# Patient Record
Sex: MALE | Race: OTHER | Employment: UNEMPLOYED | URBAN - METROPOLITAN AREA
[De-identification: names, ages, dates, MRNs, and addresses within clinical notes are randomized per-mention and may not be internally consistent; named-entity substitution may affect disease eponyms.]

---

## 2017-10-17 ENCOUNTER — TELEPHONE (OUTPATIENT)
Dept: CARDIOLOGY CLINIC | Age: 52
End: 2017-10-17

## 2017-12-07 ENCOUNTER — OFFICE VISIT (OUTPATIENT)
Dept: CARDIOLOGY CLINIC | Age: 52
End: 2017-12-07

## 2017-12-07 VITALS
DIASTOLIC BLOOD PRESSURE: 75 MMHG | BODY MASS INDEX: 23.82 KG/M2 | HEART RATE: 81 BPM | WEIGHT: 143 LBS | HEIGHT: 65 IN | OXYGEN SATURATION: 95 % | SYSTOLIC BLOOD PRESSURE: 108 MMHG

## 2017-12-07 DIAGNOSIS — I25.10 CORONARY ARTERY DISEASE DUE TO LIPID RICH PLAQUE: Primary | ICD-10-CM

## 2017-12-07 DIAGNOSIS — I20.8 OTHER FORMS OF ANGINA PECTORIS (HCC): ICD-10-CM

## 2017-12-07 DIAGNOSIS — I25.83 CORONARY ARTERY DISEASE DUE TO LIPID RICH PLAQUE: Primary | ICD-10-CM

## 2017-12-07 DIAGNOSIS — R06.00 DYSPNEA, UNSPECIFIED TYPE: ICD-10-CM

## 2017-12-07 RX ORDER — ISOSORBIDE MONONITRATE 30 MG/1
TABLET, EXTENDED RELEASE ORAL DAILY
COMMUNITY

## 2017-12-07 RX ORDER — NITROGLYCERIN 0.4 MG/1
0.4 TABLET SUBLINGUAL
Qty: 1 BOTTLE | Refills: 3 | Status: SHIPPED | OUTPATIENT
Start: 2017-12-07

## 2017-12-07 RX ORDER — RANITIDINE 300 MG/1
300 TABLET ORAL DAILY
COMMUNITY

## 2017-12-07 RX ORDER — SIMVASTATIN 40 MG/1
TABLET, FILM COATED ORAL
COMMUNITY

## 2017-12-07 RX ORDER — ENALAPRIL MALEATE 2.5 MG/1
TABLET ORAL DAILY
COMMUNITY

## 2017-12-07 RX ORDER — CHLORPROMAZINE HYDROCHLORIDE 100 MG/1
100 TABLET, FILM COATED ORAL 3 TIMES DAILY
COMMUNITY

## 2017-12-07 RX ORDER — ASPIRIN 81 MG/1
81 TABLET ORAL DAILY
Qty: 30 TAB | Refills: 6 | Status: ON HOLD | OUTPATIENT
Start: 2017-12-07 | End: 2018-05-10

## 2017-12-07 RX ORDER — CARVEDILOL 12.5 MG/1
12.5 TABLET ORAL 2 TIMES DAILY WITH MEALS
COMMUNITY

## 2017-12-07 RX ORDER — LEVOTHYROXINE SODIUM 125 UG/1
TABLET ORAL
COMMUNITY

## 2017-12-07 RX ORDER — DIGOXIN 125 MCG
TABLET ORAL DAILY
Status: ON HOLD | COMMUNITY
End: 2018-05-10

## 2017-12-07 RX ORDER — DIPHENHYDRAMINE HCL 25 MG
25 TABLET ORAL
COMMUNITY
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
## 2017-12-07 NOTE — MR AVS SNAPSHOT
Visit Information Yomi Erickson Personal Médico Departamento Teléfono del Dep. Número de visita 12/7/2017  1:30 PM Bijan Toledo MD Cardio Specialist at [facility] [phone] Your Appointments 12/27/2017  1:00 PM  
Follow Up with Rubi Metcalf MD  
Cardio Specialist at [facility]) Appt Note: f/u after testing [facility] [address] Upcoming Health Maintenance Date Due Hepatitis C Screening 1965 DTaP/Tdap/Td series (1 - Tdap) 8/24/1985 FOBT Q 1 YEAR AGE 50-75 8/24/2014 Influenza Age 5 to Adult 8/1/2017 [place]  Review Complete El: 12/7/2017 Por: Laureano Bruce A partir del:  12/7/2017 No Known Allergies Vacunas actuales [facility] No hay ninguna vacuna archivada. No revisadas esta visita You Were Diagnosed With   
  
 France Ansari Coronary artery disease due to lipid rich plaque    -  Primary ICD-10-CM: I25.10, I25.83 ICD-9-CM: 414.00, 414.3 Dyspnea, unspecified type     ICD-10-CM: R06.00 
ICD-9-CM: 786.09 Other forms of angina pectoris ([address]     ICD-10-CM: I20.8 ICD-9-CM: 413.9 Partes vitales PS Pulso [place] ( percentil de crecimiento) Peso (percentil de crecimiento) SpO2 BMI ([facility])  
 108/75 81 5' 5\" (1.651 m) 143 lb (64.9 kg) 95% 23.8 kg/m2 Estatus de tabaquísmo Never Smoker BMI and BSA Data Body Mass Index Body Surface Area  
 23.8 kg/m 2 1.73 m 2 Levy lista de medicamentos actualizada Lista actualizada el: 12/7/17  2:24 PM.  Vola Clamp use levy lista de medicamentos más reciente. ALLERGY 25 mg tablet Medicamento genérico:  diphenhydrAMINE Take 25 mg by mouth every six (6) hours as needed for Sleep. chlorproMAZINE 100 mg tablet También conocido maite:  THORAZINE Take 100 mg by mouth three (3) times daily. COREG 12.5 mg tablet Medicamento genérico:  carvedilol Take 12.5 mg by mouth two (2) times daily (with meals). digoxin 0.125 mg tablet También conocido maite:  LANOXIN Take  by mouth daily. enalapril 2.5 mg tablet También conocido maite:  Janet Burow Take  by mouth daily. isosorbide mononitrate ER 30 mg tablet También conocido maite:  IMDUR Take  by mouth daily. levothyroxine 125 mcg tablet También conocido maite:  SYNTHROID Take  by mouth Daily (before breakfast). ZANTAC 300 mg tablet Medicamento genérico:  raNITIdine Take 300 mg by mouth daily. ZOCOR 40 mg tablet Medicamento genérico:  simvastatin Take  by mouth nightly. Hicimos lo siguiente AMB POC EKG ROUTINE W/ 12 LEADS, INTER & REP [39855 CPT(R)] LIPID PANEL [73052 CPT(R)] Comentarios:  
 Fasting 12 hours prior! Water only. Por hacer 12/18/2017 ECHO:  2D ECHO COMPLETE ADULT (TTE) W OR WO CONTR   
  
 12/18/2017 Imaging:  NM CARDIAC SPECT W STRS/REST MULT   
  
 12/18/2017 ECG:  NUCLEAR STRESS TEST Introducing [facility] & HEALTH SERVICES! [facility] introduce portal paciente MyChart . Ahora se puede acceder a partes de rodriguez expediente médico, enviar por correo electrónico la oficina de rodriguez médico y solicitar renovaciones de medicamentos en línea. En rodriguez navegador de Internet , Becky Ashley a [email or web address] Natacha clic en el usuario por Loli Hawk? Bogdan Carrs clic aquí en la sesión Eleanor Favre. Verá la página de registro [place]. Ingrese rodriguez código de [employer] silas y maite aparece a continuación. Usted no tendrá que [employer] código después de becki completado el proceso de registro . Si usted no se inscribe antes de la fecha de caducidad , debe solicitar un nuevo código. · MyChart Código de acceso : [id number] Expires: 3/7/2018  2:23 PM 
 
 Ingresa los últimos cuatro dígitos de rodriguez Número de Seguro Social ( xxxx ) y fecha de nacimiento ( dd / mm / aaaa ) maite se indica y natacha clic en Enviar. Usted será llevado a la siguiente página de registro . Crear un ID MyChart . Esta será rodriguez ID de inicio de sesión de MyChart y no puede ser [place] , por lo que pensar en abraham que es Katherine Ced[place] y fácil de recordar . Crear abraham contraseña MyChart . Usted puede cambiar rodriguez contraseña en cualquier momento . Ingrese rodriguez Password Reset de preguntas y [place] . [place] se puede utilizar en un momento posterior si usted olvida rodriguez contraseña. Introduzca rodriguez dirección de correo electrónico . Sirena Points recibirá abraham notificación por correo electrónico cuando la nueva información está disponible en MyChart . Suzen Lopez clic en Registrarse. Rbuen Torresa anup y descargar porciones de rodriguez expediente médico. 
Natacha clic en el enlace de descarga del menú Resumen para descargar abraham copia portátil de rodriguez información médica . Si tiene [employer] , por favor visite la sección de preguntas frecuentes del sitio web MyChart . Recuerde, MyChart NO es que se utilizará para las necesidades urgentes. Para emergencias médicas , llame al 911 . Ahora disponible en rodriguez iPhone y Android ! Por favor proporcione jatinder resumen de la documentación de cuidado a rodriguez próximo proveedor. Your primary care clinician is listed as Roxi Walls. If you have any questions after today's visit, please call [phone].

## 2017-12-08 ENCOUNTER — HOSPITAL ENCOUNTER (OUTPATIENT)
Dept: LAB | Age: 52
Discharge: HOME | End: 2017-12-08

## 2017-12-08 PROCEDURE — 99001 SPECIMEN HANDLING PT-LAB: CPT | Performed by: INTERNAL MEDICINE

## 2017-12-09 LAB
CHOLEST SERPL-MCNC: 170 MG/DL (ref 100–199)
HDLC SERPL-MCNC: 44 MG/DL
LDLC SERPL CALC-MCNC: 103 MG/DL (ref 0–99)
TRIGL SERPL-MCNC: 114 MG/DL (ref 0–149)
VLDLC SERPL CALC-MCNC: 23 MG/DL (ref 5–40)

## 2017-12-19 ENCOUNTER — HOSPITAL ENCOUNTER (OUTPATIENT)
Dept: NUCLEAR MEDICINE | Age: 52
Discharge: HOME | End: 2017-12-19
Attending: INTERNAL MEDICINE

## 2017-12-19 ENCOUNTER — HOSPITAL ENCOUNTER (OUTPATIENT)
Dept: NON INVASIVE DIAGNOSTICS | Age: 52
End: 2017-12-19
Attending: INTERNAL MEDICINE

## 2017-12-19 DIAGNOSIS — I20.8 OTHER FORMS OF ANGINA PECTORIS (HCC): ICD-10-CM

## 2017-12-19 DIAGNOSIS — I25.83 CORONARY ARTERY DISEASE DUE TO LIPID RICH PLAQUE: ICD-10-CM

## 2017-12-19 DIAGNOSIS — R06.00 DYSPNEA, UNSPECIFIED TYPE: ICD-10-CM

## 2017-12-19 DIAGNOSIS — I25.10 CORONARY ARTERY DISEASE DUE TO LIPID RICH PLAQUE: ICD-10-CM

## 2017-12-27 ENCOUNTER — HOSPITAL ENCOUNTER (OUTPATIENT)
Dept: NON INVASIVE DIAGNOSTICS | Age: 52
Discharge: HOME | End: 2017-12-27
Attending: INTERNAL MEDICINE
Payer: MEDICARE

## 2017-12-27 ENCOUNTER — HOSPITAL ENCOUNTER (OUTPATIENT)
Dept: NUCLEAR MEDICINE | Age: 52
Discharge: HOME | End: 2017-12-27
Attending: INTERNAL MEDICINE
Payer: MEDICARE

## 2017-12-27 ENCOUNTER — OFFICE VISIT (OUTPATIENT)
Dept: CARDIOLOGY CLINIC | Age: 52
End: 2017-12-27

## 2017-12-27 VITALS
OXYGEN SATURATION: 96 % | BODY MASS INDEX: 23.49 KG/M2 | HEART RATE: 70 BPM | WEIGHT: 141 LBS | HEIGHT: 65 IN | SYSTOLIC BLOOD PRESSURE: 120 MMHG | DIASTOLIC BLOOD PRESSURE: 80 MMHG

## 2017-12-27 DIAGNOSIS — I20.8 OTHER FORMS OF ANGINA PECTORIS (HCC): ICD-10-CM

## 2017-12-27 DIAGNOSIS — I25.83 CORONARY ARTERY DISEASE DUE TO LIPID RICH PLAQUE: ICD-10-CM

## 2017-12-27 DIAGNOSIS — I42.9 CARDIOMYOPATHY, UNSPECIFIED TYPE (HCC): ICD-10-CM

## 2017-12-27 DIAGNOSIS — I25.10 CORONARY ARTERY DISEASE DUE TO LIPID RICH PLAQUE: ICD-10-CM

## 2017-12-27 DIAGNOSIS — R06.00 DYSPNEA, UNSPECIFIED TYPE: ICD-10-CM

## 2017-12-27 DIAGNOSIS — I10 ESSENTIAL HYPERTENSION WITH GOAL BLOOD PRESSURE LESS THAN 140/90: Primary | ICD-10-CM

## 2017-12-27 PROCEDURE — 93306 TTE W/DOPPLER COMPLETE: CPT

## 2017-12-27 PROCEDURE — 78452 HT MUSCLE IMAGE SPECT MULT: CPT

## 2017-12-27 PROCEDURE — 93017 CV STRESS TEST TRACING ONLY: CPT

## 2017-12-27 NOTE — MR AVS SNAPSHOT
Visit Information Kaela Vencesjusodin Personal Médico Departamento Teléfono del Dep. Número de visita  
 12/27/2017  1:00 PM Bijan Morse MD Cardio Specialist at [place] [address] [phone] Follow-up Instructions Return in about 3 months (around 3/27/2018). Follow-up and Disposition History Your Appointments 3/27/2018  1:15 PM  
Follow Up with Bijan Morse MD  
Cardio Specialist at [facility]) Appt Note: 3 months follow up  
 [address] [facility] [address] [facility] [address]  
  
   
 [address] [facility] Upcoming Health Maintenance Date Due Hepatitis C Screening 1965 DTaP/Tdap/Td series (1 - Tdap) 8/24/1985 FOBT Q 1 YEAR AGE 50-75 8/24/2014 Influenza Age 5 to Adult 8/1/2017 Alergias  Review Complete El: 12/27/2017 Por: Gloria Ritchie A partir del:  12/27/2017 No Known Allergies Vacunas actuales Lisa Spruce No hay ninguna vacuna archivada. No revisadas esta visita You Were Diagnosed With   
  
 Margot Franco Essential hypertension with goal blood pressure less than 140/90    -  Primary ICD-10-CM: I10 
ICD-9-CM: 401.9 Cardiomyopathy, unspecified type ([address]     ICD-10-CM: I42.9 ICD-9-CM: 425.4 Partes vitales PS Pul[place] ( percentil de crecimiento) Peso (percentil de crecimiento) SpO2 BMI [facility]) 120/80 70 5' 5\" (1.651 m) 141 lb (64 kg) 96% 23.46 kg/m2 Estatus de tabaquísmo Never Smoker BMI and BSA Data Body Mass Index Body Surface Area  
 23.46 kg/m 2 1.71 m 2 Levy lista de medicamentos actualizada Lista actualizada el: 12/27/17  1:52 PM.  Lawerence Coup use levy lista de medicamentos más reciente. ALLERGY 25 mg tablet Medicamento genérico:  diphenhydrAMINE Take 25 mg by mouth every six (6) hours as needed for Sleep. aspirin delayed-release 81 mg tablet Take 1 Tab by mouth daily. chlorproMAZINE 100 mg tablet También conocido maite:  THORAZINE Take 100 mg by mouth three (3) times daily. COREG 12.5 mg tablet Medicamento genérico:  carvedilol Take 12.5 mg by mouth two (2) times daily (with meals). digoxin 0.125 mg tablet También conocido maite:  LANOXIN Take  by mouth daily. enalapril 2.5 mg tablet También conocido maite:  M[place] Captain Take  by mouth daily. isosorbide mononitrate ER 30 mg tablet También conocido maite:  IMDUR Take  by mouth daily. levothyroxine 125 mcg tablet También conocido maite:  SYNTHROID Take  by mouth Daily (before breakfast). nitroglycerin 0.4 mg SL tablet También conocido maite:  NITROSTAT  
1 Tab by SubLINGual route every five (5) minutes as needed for Chest Pain. ZANTAC 300 mg tablet Medicamento genérico:  raNITIdine Take 300 mg by mouth daily. ZOCOR 40 mg tablet Medicamento genérico:  simvastatin Take  by mouth nightly. Instrucciones de seguimiento Return in about 3 months (around 3/27/2018). Por hacer 12/27/2017  2:00 PM  
  Appointment with [facility] [address] [facility] CV SERV at [facility] NON-INVASIVE [address] [place] [address] ([phone]) Patient needs to bring a current list of all medications  No preparation is required for this study  This study requires patient to bring a written physicians order or the MD office may fax the order to Central Scheduling at [phone]. This exam is performed in the [address] at [facility] in the echo department. Please have the patient arrive 15 minutes prior to their schedule appointment time and report to Patient Registration at the main entrance of the hospital.     AGE LIMIT for this procedure at [facility] is 25years old. All patients under 25years of age should be referred to [facility]. Instrucciones para el Paciente Take Lasix- (Furosemide) every other day Introducing hospitals HEALTH SERVICES! [facility] introduce portal paciente MyChart . Ahora se puede acceder a partes de rodriguez expediente médico, enviar por correo electrónico la oficina de rodriguez médico y solicitar renovaciones de medicamentos en línea. En rodriguez navegador de Internet , Veronique Govea a [email or web address] Sherlyn clic en el usuario por Julius Jacobo? Marlyn Gasmen clic aquí en la sesión Cincinnatigibran Shah. Verá la página de registro [place]. Ingrese rodriguez código de [employer] silas y maite aparece a continuación. Usted no tendrá que [employer] código después de becki completado el proceso de registro . Si usted no se inscribe antes de la fecha de caducidad , debe solicitar un nuevo código. · MyChart Código de acceso : [id number] Expires: 3/7/2018  2:23 PM 
 
Ingresa los últimos cuatro dígitos de rodriguez Número de Seguro Social ( xxxx ) y fecha de nacimiento ( dd / mm / aaaa ) maite se indica y sherlyn clic en Enviar. Usted será llevado a la siguiente página de registro . Crear un ID MyChart . Esta será rodriguez ID de inicio de sesión de MyChart y no puede ser [place] , por lo que pensar en abraham que es [place] y fácil de recordar . Crear abraham contraseña MyChart . Usted puede cambiar rodriguez contraseña en cualquier momento . Ingrese rodriguez Password Reset de preguntas y [place] . [place] se puede utilizar en un momento posterior si usted olvida rodriguez contraseña. Introduzca rodriguez dirección de correo electrónico . Bruno Kidney recibirá abraham notificación por correo electrónico cuando la nueva información está disponible en MyChart . Porfirio pink en Registrarse. Jael Manley anup y descargar porciones de rodriguez expediente médico. 
Sherlyn joesph en el enlace de descarga del menú Resumen para descargar abraham copia portátil de rodriguez información médica . Si tiene [employer] , por favor visite la sección de preguntas frecuentes del sitio web MyChart .  Recuerde, MyCharspring NO es que se utilizará para las [employer]. Para emergencias médicas , llame al 911 . Ahora disponible en rodriguez iPhone y Android ! Por favor proporcione jatinder resumen de la documentación de cuidado a rodriguez próximo proveedor. Your primary care clinician is listed as Fatou Valdez. If you have any questions after today's visit, please call [phone].

## 2017-12-27 NOTE — PROGRESS NOTES
Cardiovascular Specialists    Mr. Jagruti Olivo is a 47 y.o. Sami speaking male with a history of hypertension, hyperlipidemia, mental retardation, mini stroke and possible cardiac problem. Mr. Martin Nuñez is here today with his nephew and the sister. There are no new symptoms to report since last time. He continues to have some mild to moderate dyspnea on moderate to severe exertion. There is no change. He denies any PND. He denies lower extremity swelling. There is no chest pain or chest tightness. Denies any nausea, vomiting, abdominal pain, fever, chills, sputum production. No hematuria or other bleeding complaints    Past Medical History:   Diagnosis Date    Essential hypertension     Hyperlipidemia     Mental retardation     per sister    Mini stroke ([facility] [address] 2014    Thyroid disease          Past Surgical History:   Procedure Laterality Date    HX CORONARY STENT PLACEMENT      HX HEART CATHETERIZATION         Current Outpatient Prescriptions   Medication Sig    chlorproMAZINE (THORAZINE) 100 mg tablet Take 100 mg by mouth three (3) times daily.  enalapril (VASOTEC) 2.5 mg tablet Take  by mouth daily.  raNITIdine (ZANTAC) 300 mg tablet Take 300 mg by mouth daily.  simvastatin (ZOCOR) 40 mg tablet Take  by mouth nightly.  carvedilol (COREG) 12.5 mg tablet Take 12.5 mg by mouth two (2) times daily (with meals).  isosorbide mononitrate ER (IMDUR) 30 mg tablet Take  by mouth daily.  levothyroxine (SYNTHROID) 125 mcg tablet Take  by mouth Daily (before breakfast).  digoxin (LANOXIN) 0.125 mg tablet Take  by mouth daily.  diphenhydrAMINE (ALLERGY) 25 mg tablet Take 25 mg by mouth every six (6) hours as needed for Sleep.  aspirin delayed-release 81 mg tablet Take 1 Tab by mouth daily.  nitroglycerin (NITROSTAT) 0.4 mg SL tablet 1 Tab by SubLINGual route every five (5) minutes as needed for Chest Pain.      No current facility-administered medications for this visit. Allergies and Sensitivities:  No Known Allergies    Family History:  No family history on file. Social History:  Social History   Substance Use Topics    Smoking status: Never Smoker    Smokeless tobacco: Never Used    Alcohol use No     He  reports that he has never smoked. He has never used smokeless tobacco.  He  reports that he does not drink alcohol. Review of Systems:  Cardiac symptoms as noted above in HPI. All others negative. Denies fatigue, malaise, skin rash, blurring vision, photophobia, neck pain, hemoptysis, chronic cough, nausea, vomiting, hematuria, burning micturition, BRBPR, chronic headaches. Physical Exam:  BP Readings from Last 3 Encounters:   12/07/17 108/75         Pulse Readings from Last 3 Encounters:   12/07/17 81          Wt Readings from Last 3 Encounters:   12/07/17 143 lb (64.9 kg)       Constitutional: Oriented to person, place, and time. HENT: Head: Normocephalic and atraumatic. Neck: No JVD present. Cardiovascular: Regular rhythm. No murmur, gallop or rubs appreciated  Lung: Breath sounds normal. No respiratory distress. No ronchi or rales appreciated  Abdominal: No tenderness. No rebound and no guarding. Musculoskeletal: There is trace lower extremity edema. No cynosis      Review of Data  LABS:   No results found for: NA, K, CL, CO2, GLU, BUN, CREA  Lipids Latest Ref Rng & Units 12/8/2017   Chol, Total 100 - 199 mg/dL 170   HDL >39 mg/dL 44   LDL 0 - 99 mg/dL 103(H)   Trig 0 - 149 mg/dL 114     No results found for: GPT, ALT  No results found for: HBA1C, HGBE8, OIR8QGVT, IPV6TEDH, HUD8YNJV    EKG  (12/17) Sinus rhythm at 71 beats per minute. Normal NE and QRS interval.  No pathologic Q wave. ECHO (12/17)    STRESS TEST (12/17)  - Exercise nuclear stress test using Sammy treadmill protocol.   - Ischemic changes: No ischemic changes with exercise.    - There was stopped small area of mildly intense partially reversible apical  defect noted which is most consistent with likely apical thinning or attenuation  artifact. Inferior perfusion defect is most likely from diaphragmatic  attenuation artifact. Otherwise no significant reversible defect noted  - Low risk stress test for ischemia. IMPRESSION & PLAN:  Mr. Reena Joel is a 47 y.o. male with multiple medical problems. Cardiomyopathy:  He has moderate cardiomyopathy on ECHO in 12/17. No significant fluid overload  NYHA CLASS II  On BB, ACE-I, Digoxin, IMdur  WIll give lasix 20 mg every other day. Stress test , low risk as mentioned above  Per sister, heart was very weak in [place]. NO details available. Per sister, this is better. Hypertension:  Currently Mr. Reena Joel is on Enalapril and Coreg and Imdur. /80 mm Hg    Hypothyroidism:  He is on Levothyroxine and I would recommend to continue same. This plan was discussed with patient, sister and nephew and sister who is in agreement. Thank you for allowing me to participate in patient care. Please feel free to call me if you have any question or concern. Katya Martin MD  Please note: This document has been produced using voice recognition software. Unrecognized errors in transcription may be present.

## 2017-12-28 RX ORDER — FUROSEMIDE 20 MG/1
TABLET ORAL
Qty: 30 TAB | Refills: 6 | Status: SHIPPED | OUTPATIENT
Start: 2017-12-28

## 2018-04-13 ENCOUNTER — OFFICE VISIT (OUTPATIENT)
Dept: SURGERY | Age: 53
End: 2018-04-13

## 2018-04-13 VITALS
HEART RATE: 108 BPM | BODY MASS INDEX: 23.99 KG/M2 | DIASTOLIC BLOOD PRESSURE: 80 MMHG | SYSTOLIC BLOOD PRESSURE: 106 MMHG | WEIGHT: 144 LBS | HEIGHT: 65 IN | RESPIRATION RATE: 20 BRPM | TEMPERATURE: 96 F

## 2018-04-13 DIAGNOSIS — Z12.11 ENCOUNTER FOR SCREENING COLONOSCOPY: Primary | ICD-10-CM

## 2018-04-13 RX ORDER — POLYETHYLENE GLYCOL 3350, SODIUM SULFATE ANHYDROUS, SODIUM BICARBONATE, SODIUM CHLORIDE, POTASSIUM CHLORIDE 236; 22.74; 6.74; 5.86; 2.97 G/4L; G/4L; G/4L; G/4L; G/4L
POWDER, FOR SOLUTION ORAL
Qty: 1 BOTTLE | Refills: 0 | Status: SHIPPED | OUTPATIENT
Start: 2018-04-13 | End: 2018-05-10

## 2018-04-13 RX ORDER — CHLORPROMAZINE HYDROCHLORIDE 100 MG/1
100 TABLET, FILM COATED ORAL 3 TIMES DAILY
Status: ON HOLD | COMMUNITY
End: 2018-05-10

## 2018-04-13 RX ORDER — DIGOXIN 125 MCG
TABLET ORAL DAILY
COMMUNITY

## 2018-04-13 NOTE — MR AVS SNAPSHOT
[facility] 
 
 
 [address] [facility] [address] [facility] [address] 
[phone] Patient: Wm Ames MRN: [id number] :1965 Visit Information Larygavin sanabria Arline Personal Médico Departamento Teléfono del Dep. Número de visita 2018  1:00 PM YASH [facility] [phone] Upcoming Health Maintenance Date Due Hepatitis C Screening 1965 Pneumococcal 19-64 Medium Risk (1 of 1 - PPSV23) 1983 DTaP/Tdap/Td series (1 - Tdap) 1985 FOBT Q 1 YEAR AGE 50-75 2014 Influenza Age 5 to Adult 2017 MEDICARE YEARLY EXAM 3/27/2018 Alergias  Review Complete El: 2018 Por: YASH Laura Lunch del:  2018 No Known Allergies Vacunas actuales [place] No hay ninguna vacuna archivada. No revisadas esta visita You Were Diagnosed With   
  
 [facility] Encounter for screening colonoscopy    -  Primary ICD-10-CM: Z12.11 ICD-9-CM: V76.51 Partes vitales PS Pulso Temperatura Resp Harmans ( percentil de crecimiento) Peso (percentil de crecimiento) 106/80 (BP 1 Location: Left arm, BP Patient Position: At rest) (!) 108 96 °F (35.6 °C) (Oral) 20 5' 5\" (1.651 m) 144 lb (65.3 kg) BMI (130 [facility]) Estatus de tabaquísmo 23.96 kg/m2 Never Smoker BMI and BSA Data Body Mass Index Body Surface Area  
 23.96 kg/m 2 1.73 m 2 [facility] Name Phone [address], [address] [phone] Levy lista de medicamentos actualizada Pretty Hager actualizada 18  2:06 PM.  Hermann Kristal use levy lista de medicamentos más reciente. ALLERGY 25 mg tablet Medicamento genérico:  diphenhydrAMINE Take 25 mg by mouth every six (6) hours as needed for Sleep. aspirin delayed-release 81 mg tablet Take 1 Tab by mouth daily. * chlorproMAZINE 100 mg tablet También conocido maite:  THORAZINE Take 100 mg by mouth three (3) times daily. * chlorproMAZINE 100 mg tablet También conocido maite:  THORAZINE Take 100 mg by mouth three (3) times daily. COREG 12.5 mg tablet Medicamento genérico:  carvedilol Take 12.5 mg by mouth two (2) times daily (with meals). * digoxin 0.125 mg tablet También conocido maite:  LANOXIN Take  by mouth daily. * digoxin 0.125 mg tablet También conocido maite:  LANOXIN Take  by mouth daily. enalapril 2.5 mg tablet También conocido maite:  Selinda Muse Take  by mouth daily. furosemide 20 mg tablet También conocido maite:  LASIX Take every other day  
  
 isosorbide mononitrate ER 30 mg tablet También conocido maite:  IMDUR Take  by mouth daily. levothyroxine 125 mcg tablet También conocido maite:  SYNTHROID Take  by mouth Daily (before breakfast). nitroglycerin 0.4 mg SL tablet También conocido maite:  NITROSTAT  
1 Tab by SubLINGual route every five (5) minutes as needed for Chest Pain. PEG 3350-Electrolytes 236-22.74-6.74 -5.86 gram suspension También conocido maite:  GO-LYTELY Take as directed ZANTAC 300 mg tablet Medicamento genérico:  raNITIdine Take 300 mg by mouth daily. ZOCOR 40 mg tablet Medicamento genérico:  simvastatin Take  by mouth nightly. * Aviso:  Esta lista contiene medicamentos que son iguales a otros medicamentos recetados para usted. Aruna las instrucciones con cuidado y pida a rodriguez personal médico que revise la lista de medicamentos y las instrucciones correspondientes con usted. Recetas Enviado a la Saline Refills PEG 3350-Electrolytes (GO-LYTELY) 236-22.74-6.74 -5.86 gram suspension 0 Sig: Take as directed Class: Normal  
 Pharmacy: [facility] DR NAEEM GOULD [address], [address] Ph #: [phone] Instrucciones para el Paciente If you have any questions or concerns about today's appointment, the verbal and/or written instructions you were given for follow up care, please call our office at [phone]. [facility] [place] [address] [facility], [address] [place], [address] [place] [address] 
 
[phone] office [phone] Introducing [address]! [facility] introduce portal paciente MyChart . Ahora se puede acceder a partes de rodriguez expediente médico, enviar por correo electrónico la oficina de rodriguez médico y solicitar renovaciones de medicamentos en línea. En rodriguez navegador de Internet , Hesham Meade a [email or web address] Sherlyn clic en el usuario por Tin Reilly? Neto Lennox clic aquí en la sesión Nathan Lint. Verá la página de registro [place]. Ingrese rodriguez código de [employer] silas y maite aparece a continuación. Usted no tendrá que [employer] código después de becki completado el proceso de registro . Si usted no se inscribe antes de la fecha de caducidad , debe solicitar un nuevo código. · MyChart Código de acceso : [id number] Expires: 7/12/2018  1:04 PM 
 
Ingresa los últimos cuatro dígitos de rodriguez Número de Seguro Social ( xxxx ) y fecha de nacimiento ( dd / mm / aaaa ) maite se indica y sherlyn clic en Enviar. Usted será llevado a la siguiente página de registro . Crear un ID MyChart . Esta será rodriguez ID de inicio de sesión de MyChart y no puede ser [place] , por lo que pensar en abraham que es [place] y fácil de recordar . Crear abraham contraseña MyChart . Usted puede cambiar rodriguez contraseña en cualquier momento . Ingrese rodriguez Password Reset de preguntas y [place] . [place] se puede utilizar en un momento posterior si usted olvida rodriguez contraseña. Introduzca rodriguez dirección de correo electrónico . Amy Knows recibirá abraham notificación por correo electrónico cuando la nueva información está disponible en MyChart . Johan pink en Registrarse. Silva Ritchiea anup y descargar porciones de rodriguez expediente médico. 
Natacha joesph en el enlace de descarga del menú Resumen para descargar abraham copia portátil de rodriguez información médica . Si tiene [employer] , por favor visite la sección de preguntas frecuentes del sitio web MyChart . Recuerde, MyChart NO es que se utilizará para las necesidades urgentes. Para emergencias médicas , llame al 911 . Ahora disponible en rodriguez iPhone y Android ! Por favor proporcione jatinder resumen de la documentación de cuidado a rodriguez próximo proveedor. Your primary care clinician is listed as Scott Laguerre. If you have any questions after today's visit, please call [phone].

## 2018-04-13 NOTE — PATIENT INSTRUCTIONS
If you have any questions or concerns about today's appointment, the verbal and/or written instructions you were given for follow up care, please call our office at [phone].     [facility] - [address]    [phone] office  [phone]

## 2018-04-13 NOTE — PROGRESS NOTES
Vern Mendez is a 47 y.o. male who presents today with   Chief Complaint   Patient presents with    Colon Cancer Screening     consult                1. Have you been to the ER, urgent care clinic since your last visit? Hospitalized since your last visit? No    2. Have you seen or consulted any other health care providers outside of the [address] [place] [address] since your last visit? Include any pap smears or colon screening.  No

## 2018-04-13 NOTE — PROGRESS NOTES
[address]  [address] [place] [address]  [phone]    Colonoscopy History and Physical    Patient: Missy Guzman MRN: [id number]  SSN: [id number]    YOB: 1965  Age: 47 y.o. Sex: male      Subjective:      Missy Guzman is a 47 y.o. male who presents for colonoscopy for   Screening colonoscopy. Patient has no complaints. Patient reports family history and abdominal surgeries as noted below. Past Medical History:   Diagnosis Date    Cardiomyopathy [facility])     Essential hypertension     Hyperlipidemia     Mental retardation     per sister    Mini stroke ([facility] [address] 2014    Thyroid disease      Past Surgical History:   Procedure Laterality Date    HX APPENDECTOMY      HX CORONARY STENT PLACEMENT      HX HEART CATHETERIZATION        Family History   Problem Relation Age of Onset   Richa Carcamo MS Mother     Heart Disease Father     Heart Attack Father    Richa Carcamo MS Sister      age 25 onset    Richa Carcamo MS Brother      age onset 21     Social History   Substance Use Topics    Smoking status: Never Smoker    Smokeless tobacco: Never Used    Alcohol use No      Prior to Admission medications    Medication Sig Start Date End Date Taking? Authorizing Provider   digoxin (LANOXIN) 0.125 mg tablet Take  by mouth daily. Yes Historical Provider   chlorproMAZINE (THORAZINE) 100 mg tablet Take 100 mg by mouth three (3) times daily. Yes Historical Provider   furosemide (LASIX) 20 mg tablet Take every other day 12/28/17  Yes Zach ALMODOVAR MD   chlorproMAZINE (THORAZINE) 100 mg tablet Take 100 mg by mouth three (3) times daily. Yes Historical Provider   enalapril (VASOTEC) 2.5 mg tablet Take  by mouth daily. Yes Historical Provider   raNITIdine (ZANTAC) 300 mg tablet Take 300 mg by mouth daily. Yes Historical Provider   simvastatin (ZOCOR) 40 mg tablet Take  by mouth nightly.    Yes Historical Provider   carvedilol (COREG) 12.5 mg tablet Take 12.5 mg by mouth two (2) times daily (with meals). Yes Historical Provider   isosorbide mononitrate ER (IMDUR) 30 mg tablet Take  by mouth daily. Yes Historical Provider   levothyroxine (SYNTHROID) 125 mcg tablet Take  by mouth Daily (before breakfast). Yes Historical Provider   diphenhydrAMINE (ALLERGY) 25 mg tablet Take 25 mg by mouth every six (6) hours as needed for Sleep. Yes Historical Provider   digoxin (LANOXIN) 0.125 mg tablet Take  by mouth daily. Historical Provider   aspirin delayed-release 81 mg tablet Take 1 Tab by mouth daily. 12/7/17   Joaquín Dias MD   nitroglycerin (NITROSTAT) 0.4 mg SL tablet 1 Tab by SubLINGual route every five (5) minutes as needed for Chest Pain. 12/7/17   Bijan Swain MD        No Known Allergies    Review of Systems:  Review of systems performed with findings as noted. Objective:     Vitals:    04/13/18 1332   BP: 106/80   Pulse: (!) 108   Resp: 20   Temp: 96 °F (35.6 °C)   TempSrc: Oral   Weight: 65.3 kg (144 lb)   Height: 5' 5\" (1.651 m)        Physical Exam:  GENERAL: alert, cooperative, no distress, appears stated age  LUNG: clear to auscultation bilaterally  HEART: regular rate and rhythm, S1, S2 normal, no murmur, click, rub or gallop  ABDOMEN: soft, non-tender. Bowel sounds normal. No masses,  no organomegaly  NEUROLOGIC: negative  PSYCHIATRIC: non focal    Assessment:   Makenna Coats is a 47 y.o. male who presents for colonoscopy for   Screening colonoscopy    Plan:   1. I recommend proceeding with colonoscopy. The patient was in full agreement and was eager to proceed. 2. I discussed the details of the colonoscopy procedure. The risks of colonoscopy were discussed including colon injury/perforation, anesthesia issues, bleeding, and the possibility of incomplete examination. The patient was willing to accept these risks and proceed with the examination. All questions were answered to the patient's satisfaction.      3. The patient was provided with the instructions in preparation for the colonoscopy procedure including the bowel prep recommendations.

## 2018-05-10 ENCOUNTER — HOSPITAL ENCOUNTER (OUTPATIENT)
Age: 53
Setting detail: OUTPATIENT SURGERY
Discharge: HOME | End: 2018-05-10
Attending: COLON & RECTAL SURGERY | Admitting: COLON & RECTAL SURGERY
Payer: MEDICARE

## 2018-05-10 VITALS
WEIGHT: 143.5 LBS | HEART RATE: 81 BPM | HEIGHT: 64 IN | RESPIRATION RATE: 18 BRPM | OXYGEN SATURATION: 96 % | BODY MASS INDEX: 24.5 KG/M2 | SYSTOLIC BLOOD PRESSURE: 127 MMHG | DIASTOLIC BLOOD PRESSURE: 82 MMHG | TEMPERATURE: 97.8 F

## 2018-05-10 PROCEDURE — G0500 MOD SEDAT ENDO SERVICE >5YRS: HCPCS | Performed by: COLON & RECTAL SURGERY

## 2018-05-10 PROCEDURE — 76040000019: Performed by: COLON & RECTAL SURGERY

## 2018-05-10 PROCEDURE — 77030031670 HC DEV INFL ENDOTEK BIG60 MRTM -B: Performed by: COLON & RECTAL SURGERY

## 2018-05-10 PROCEDURE — 74011250636 HC RX REV CODE- 250/636: Performed by: COLON & RECTAL SURGERY

## 2018-05-10 RX ORDER — NALOXONE HYDROCHLORIDE 0.4 MG/ML
0.4 INJECTION, SOLUTION INTRAMUSCULAR; INTRAVENOUS; SUBCUTANEOUS
Status: DISCONTINUED | OUTPATIENT
Start: 2018-05-10 | End: 2018-05-10 | Stop reason: HOSPADM

## 2018-05-10 RX ORDER — SODIUM CHLORIDE 0.9 % (FLUSH) 0.9 %
5-10 SYRINGE (ML) INJECTION AS NEEDED
Status: DISCONTINUED | OUTPATIENT
Start: 2018-05-10 | End: 2018-05-10 | Stop reason: HOSPADM

## 2018-05-10 RX ORDER — SODIUM CHLORIDE 9 MG/ML
50 INJECTION, SOLUTION INTRAVENOUS CONTINUOUS
Status: DISCONTINUED | OUTPATIENT
Start: 2018-05-10 | End: 2018-05-10 | Stop reason: HOSPADM

## 2018-05-10 RX ORDER — FLUMAZENIL 0.1 MG/ML
0.2 INJECTION INTRAVENOUS
Status: DISCONTINUED | OUTPATIENT
Start: 2018-05-10 | End: 2018-05-10 | Stop reason: HOSPADM

## 2018-05-10 RX ORDER — SODIUM CHLORIDE 0.9 % (FLUSH) 0.9 %
5-10 SYRINGE (ML) INJECTION EVERY 8 HOURS
Status: DISCONTINUED | OUTPATIENT
Start: 2018-05-10 | End: 2018-05-10 | Stop reason: HOSPADM

## 2018-05-10 RX ORDER — MIDAZOLAM HYDROCHLORIDE 1 MG/ML
.25-5 INJECTION, SOLUTION INTRAMUSCULAR; INTRAVENOUS
Status: DISCONTINUED | OUTPATIENT
Start: 2018-05-10 | End: 2018-05-10 | Stop reason: HOSPADM

## 2018-05-10 RX ORDER — DEXTROMETHORPHAN/PSEUDOEPHED 2.5-7.5/.8
1.2 DROPS ORAL
Status: DISCONTINUED | OUTPATIENT
Start: 2018-05-10 | End: 2018-05-10 | Stop reason: HOSPADM

## 2018-05-10 RX ORDER — ATROPINE SULFATE 0.1 MG/ML
0.5 INJECTION INTRAVENOUS
Status: DISCONTINUED | OUTPATIENT
Start: 2018-05-10 | End: 2018-05-10 | Stop reason: HOSPADM

## 2018-05-10 RX ORDER — EPINEPHRINE 0.1 MG/ML
1 INJECTION INTRACARDIAC; INTRAVENOUS
Status: DISCONTINUED | OUTPATIENT
Start: 2018-05-10 | End: 2018-05-10 | Stop reason: HOSPADM

## 2018-05-10 RX ADMIN — SODIUM CHLORIDE 50 ML/HR: 900 INJECTION, SOLUTION INTRAVENOUS at 14:46

## 2018-05-10 NOTE — PROCEDURES
Colonoscopy Procedure Note      Wm Ames  1965  [id number]                Date of Procedure: 5/10/2018    Indications:    Screening colonoscopy     Preoperative diagnosis: colon cancer screening z12.11      Postoperative diagnosis: diverticulosis    Title of Procedure:  Colonoscopy, screening    :  Guerda Moses MD    Assistant(s): Endoscopy RN-1: Mathew Nichols RN  Endoscopy RN-2: Kim Mathis RN    Referring Source:  Nicole Aschoff, MD    Sedation:  Demerol 50 mg IV,  Versed 3 mg IV      ASA Class: ASA 3 - Severe systemic disease but compensated        Procedure Details:    Prior to the procedure, a history and physical were performed. The patients medications, allergies and sensitivities were reviewed and all questions were answered. After informed consent was obtained for the procedure, with all risks and benefits of procedure explained. The patient was taken to the endoscopy suite and placed in the left lateral decubitus position. Patient identification and proposed procedure were verified prior to the procedure by the nurse and I. Following the  satisfactory administration of sedation,  the anus was inspected and appeared within normal limits. Digital rectal examination revealed Normal sphincter tone and squeeze pressure. Palpation revealed No Masses. Next the Olympus video colonoscope was introduced through the anus and advanced to cecum, which was identified by the ileocecal valve and appendiceal orifice, terminal ileum. The quality of preparation was good. The terminal ileum was visualized. The colonoscope was then slowly withdrawn and the mucosa carefully examined for any abnormalities. Cecal withdrawl time was greater than six minutes. The patient tolerated the procedure well. Findings:   Rectum: normal  Sigmoid: mild diverticulosis;   Descending Colon: normal  Transverse Colon: normal  Ascending Colon: normal  Cecum: normal  Terminal Ileum: normal    Interventions:  none    Specimen Removed: * No specimens in log *     Complications: None. EBL:  None. Impression:    diverticulosis     Recommendations: -Repeat colonoscopy in 10 years   Resume normal medication(s). Discharge Disposition:  Home in the company of a  when able to ambulate.         Sloane Evans MD, FACS, FASCRS  Colon and Rectal Surgery  [facility]  Office [phone]  Fax     [phone]  5/10/2018  4:25 PM       [facility]  [address] [place] [address]

## 2018-05-10 NOTE — INTERVAL H&P NOTE
H&P Update:  Jamey Null was seen and examined. History and physical has been reviewed. The patient has been examined.  There have been no significant clinical changes since the completion of the originally dated History and Physical.    Signed By: Ayaka Jacques MD     May 10, 2018 3:17 PM

## 2018-05-10 NOTE — H&P (VIEW-ONLY)
[address]  [address] [address]  [phone]    Colonoscopy History and Physical    Patient: Mati Parson MRN: [id number]  SSN: [id number]    YOB: 1965  Age: 47 y.o. Sex: male      Subjective:      Mati Parson is a 47 y.o. male who presents for colonoscopy for   Screening colonoscopy. Patient has no complaints. Patient reports family history and abdominal surgeries as noted below. Past Medical History:   Diagnosis Date    Cardiomyopathy [facility])     Essential hypertension     Hyperlipidemia     Mental retardation     per sister    Mini stroke ([facility] [address] 2014    Thyroid disease      Past Surgical History:   Procedure Laterality Date    HX APPENDECTOMY      HX CORONARY STENT PLACEMENT      HX HEART CATHETERIZATION        Family History   Problem Relation Age of Onset   Sandra Archer MS Mother     Heart Disease Father     Heart Attack Father    Sandra Archer MS Sister      age 25 onset    Sandra Archer MS Brother      age onset 21     Social History   Substance Use Topics    Smoking status: Never Smoker    Smokeless tobacco: Never Used    Alcohol use No      Prior to Admission medications    Medication Sig Start Date End Date Taking? Authorizing Provider   digoxin (LANOXIN) 0.125 mg tablet Take  by mouth daily. Yes Historical Provider   chlorproMAZINE (THORAZINE) 100 mg tablet Take 100 mg by mouth three (3) times daily. Yes Historical Provider   furosemide (LASIX) 20 mg tablet Take every other day 12/28/17  Yes Judith ALMODOVAR MD   chlorproMAZINE (THORAZINE) 100 mg tablet Take 100 mg by mouth three (3) times daily. Yes Historical Provider   enalapril (VASOTEC) 2.5 mg tablet Take  by mouth daily. Yes Historical Provider   raNITIdine (ZANTAC) 300 mg tablet Take 300 mg by mouth daily. Yes Historical Provider   simvastatin (ZOCOR) 40 mg tablet Take  by mouth nightly.    Yes Historical Provider   carvedilol (COREG) 12.5 mg tablet Take 12.5 mg by mouth two (2) times daily (with meals). Yes Historical Provider   isosorbide mononitrate ER (IMDUR) 30 mg tablet Take  by mouth daily. Yes Historical Provider   levothyroxine (SYNTHROID) 125 mcg tablet Take  by mouth Daily (before breakfast). Yes Historical Provider   diphenhydrAMINE (ALLERGY) 25 mg tablet Take 25 mg by mouth every six (6) hours as needed for Sleep. Yes Historical Provider   digoxin (LANOXIN) 0.125 mg tablet Take  by mouth daily. Historical Provider   aspirin delayed-release 81 mg tablet Take 1 Tab by mouth daily. 12/7/17   Joaquín Dias MD   nitroglycerin (NITROSTAT) 0.4 mg SL tablet 1 Tab by SubLINGual route every five (5) minutes as needed for Chest Pain. 12/7/17   Bijan Swain MD        No Known Allergies    Review of Systems:  Review of systems performed with findings as noted. Objective:     Vitals:    04/13/18 1332   BP: 106/80   Pulse: (!) 108   Resp: 20   Temp: 96 °F (35.6 °C)   TempSrc: Oral   Weight: 65.3 kg (144 lb)   Height: 5' 5\" (1.651 m)        Physical Exam:  GENERAL: alert, cooperative, no distress, appears stated age  LUNG: clear to auscultation bilaterally  HEART: regular rate and rhythm, S1, S2 normal, no murmur, click, rub or gallop  ABDOMEN: soft, non-tender. Bowel sounds normal. No masses,  no organomegaly  NEUROLOGIC: negative  PSYCHIATRIC: non focal    Assessment:   Makenna Coats is a 47 y.o. male who presents for colonoscopy for   Screening colonoscopy    Plan:   1. I recommend proceeding with colonoscopy. The patient was in full agreement and was eager to proceed. 2. I discussed the details of the colonoscopy procedure. The risks of colonoscopy were discussed including colon injury/perforation, anesthesia issues, bleeding, and the possibility of incomplete examination. The patient was willing to accept these risks and proceed with the examination. All questions were answered to the patient's satisfaction.      3. The patient was provided with the instructions in preparation for the colonoscopy procedure including the bowel prep recommendations.

## 2018-05-10 NOTE — DISCHARGE INSTRUCTIONS
Colonoscopy Discharge Instructions       Jeni Escamilla  [id number]  1965      COLONOSCOPY FINDINGS:  Your colonoscopy showed: 1. Mild diverticulosis of the sigmoid colon. 2. Otherwise normal examination. FOLLOW UP RECOMMENDATIONS:   Dr. Edgar Nicholas recommends your next colonoscopy in 10 years. DISCOMFORT:  If you have redness at your IV site- apply warm compress to area; if redness or soreness persist- contact your physician  There may be a slight amount of blood passed from the rectum, more than a teaspoon of bright red blood is not expected - contact your physician  Gaseous discomfort is common- walking, belching will help relieve any gas pains. If discomfort persist- contact your physician    DIET:   High fiber diet. ACTIVITY:  You may resume your normal daily activities, however, it is recommended that you spend the remainder of the day resting - avoiding any strenuous activities. You may not operate a vehicle for 24 hours  You may not engage in an occupation involving machinery or appliances for rest of today  You may not drink alcoholic beverages for at least 24 hours  Avoid making any critical decisions for at least 24 hour    CALL M.D. ANY SIGN OF:   Increasing pain, nausea, vomiting  Abdominal distension (swelling)  New increased bleeding   Fever or chills  Pain in chest area or shortness of breath      Ray Slater MD, FACS, FASCRS  Colon and Rectal Surgery  [facility]  Office [phone]  Fax     [phone]    Patient armband removed and given to patient to take home.   Patient was informed of the privacy risks if armband lost or stolen

## 2018-05-10 NOTE — IP AVS SNAPSHOT
[address] [facility] 
 
 
 [address] 
[phone] Patient: Lara Heath MRN: [id number] :1965 About your hospitalization You were admitted on:  May 10, 2018 You last received care in the:  [facility] PHASE 2 RECOVERY You were discharged on:  May 10, 2018 Why you were hospitalized Your primary diagnosis was:  Not on File Follow-up Information Follow up With Details Comments Contact Info Ike Fernnades MD   [place] [address] [phone] Eric Ross MD  Please contact Dr René Benítez for any questions [address] [address] [address] [phone] Discharge Orders None A check sheryl indicates which time of day the medication should be taken. My Medications CONTINUE taking these medications Instructions Each Dose to Equal  
 Morning Noon Evening Bedtime ALLERGY 25 mg tablet Generic drug:  diphenhydrAMINE Your last dose was: Your next dose is: Take 25 mg by mouth every six (6) hours as needed for Sleep. 25 mg  
    
   
   
   
  
 chlorproMAZINE 100 mg tablet Commonly known as:  THORAZINE Your last dose was: Your next dose is: Take 100 mg by mouth three (3) times daily. 100 mg COREG 12.5 mg tablet Generic drug:  carvedilol Your last dose was: Your next dose is: Take 12.5 mg by mouth two (2) times daily (with meals). 12.5 mg  
    
   
   
   
  
 digoxin 0.125 mg tablet Commonly known as:  LANOXIN Your last dose was: Your next dose is: Take  by mouth daily. enalapril 2.5 mg tablet Commonly known as:  Ebbie Setter Your last dose was: Your next dose is: Take  by mouth daily. furosemide 20 mg tablet Commonly known as:  LASIX Your last dose was: Your next dose is: Take every other day  
     
   
   
   
  
 isosorbide mononitrate ER 30 mg tablet Commonly known as:  IMDUR Your last dose was: Your next dose is: Take  by mouth daily. levothyroxine 125 mcg tablet Commonly known as:  SYNTHROID Your last dose was: Your next dose is: Take  by mouth Daily (before breakfast). nitroglycerin 0.4 mg SL tablet Commonly known as:  NITROSTAT Your last dose was: Your next dose is:    
   
   
 1 Tab by SubLINGual route every five (5) minutes as needed for Chest Pain. 0.4 mg  
    
   
   
   
  
 ZANTAC 300 mg tablet Generic drug:  raNITIdine Your last dose was: Your next dose is: Take 300 mg by mouth daily. 300 mg  
    
   
   
   
  
 ZOCOR 40 mg tablet Generic drug:  simvastatin Your last dose was: Your next dose is: Take  by mouth nightly. STOP taking these medications PEG 3350-Electrolytes 236-22.74-6.74 -5.86 gram suspension Commonly known as:  GO-LYTELY Discharge Instructions Colonoscopy Discharge Instructions Ricardo Sadie 
[id number] 
1965 COLONOSCOPY FINDINGS: 
Your colonoscopy showed: 1. Mild diverticulosis of the sigmoid colon. 2. Otherwise normal examination. FOLLOW UP RECOMMENDATIONS: 
 Dr. Gordon recommends your next colonoscopy in 10 years. DISCOMFORT: 
If you have redness at your IV site- apply warm compress to area; if redness or soreness persist- contact your physician There may be a slight amount of blood passed from the rectum, more than a teaspoon of bright red blood is not expected - contact your physician Gaseous discomfort is common- walking, belching will help relieve any gas pains. If discomfort persist- contact your physician DIET: 
 High fiber diet. ACTIVITY: 
You may resume your normal daily activities, however, it is recommended that you spend the remainder of the day resting - avoiding any strenuous activities. You may not operate a vehicle for 24 hours You may not engage in an occupation involving machinery or appliances for rest of today You may not drink alcoholic beverages for at least 24 hours Avoid making any critical decisions for at least 24 hour CALL M.D. ANY SIGN OF: Increasing pain, nausea, vomiting Abdominal distension (swelling) New increased bleeding Fever or chills Pain in chest area or shortness of breath Verenice Slater MD, FACS, FASCRS Colon and Rectal Surgery [facility] Office [phone] Fax     [phone] Patient armband removed and given to patient to take home. Patient was informed of the privacy risks if armband lost or stolen Introducing [facility]! [facility] introduce portal paciente MyChart . Ahora se puede acceder a partes de rodriguez expediente médico, enviar por correo electrónico la oficina de rodriguez médico y solicitar renovaciones de medicamentos en línea. En rodriguez navegador de Internet , Kailyn Lacy a [email or web address] Sherlyn clic en el usuario por Pat Butter? Ruthe Speed clic aquí en la sesión [facility]. Verá la página de registro [place]. Ingrese rodriguez código de [facility] silas y maite aparece a continuación. Usted no tendrá que [employer] código después de becki completado el proceso de registro . Si usted no se inscribe antes de la fecha de caducidad , debe solicitar un nuevo código. · MyChart Código de acceso : [id number] Expires: 7/12/2018  1:04 PM 
 
Ingresa los últimos cuatro dígitos de rodriguez Número de Seguro Social ( xxxx ) y fecha de nacimiento ( dd / mm / aaaa ) maite se indica y sherlyn clic en Enviar. Usted será llevado a la siguiente página de registro . Crear un ID MyChart . Esta será rodriguez ID de inicio de sesión de MyChart y no puede ser [place] , por lo que pensar en abraham que es Trula Ryder y fácil de recordar . Crear abraham contraseña MyChart . Usted puede cambiar rodriguez contraseña en cualquier momento . Ingrese rodriguez Password Reset de preguntas y [place] . [employer] se puede utilizar en un momento posterior si usted olvida rodriguez contraseña. Introduzca rodriguez dirección de correo electrónico . Dorothea Rabagozane recibirá abraham notificación por correo electrónico cuando la nueva información está disponible en MyChart . Melissa Mews clic en Registrarse. Satnam Leak anup y descargar porciones de rodriguez expediente médico. 
Natacha clic en el enlace de descarga del menú Resumen para descargar abraham copia portátil de rodriguez información médica . Si tiene [employer] , por favor visite la sección de preguntas frecuentes del sitio web MyChart . Recuerde, MyChart NO es que se utilizará para las necesidades urgentes. Para emergencias médicas , llame al 911 . Ahora disponible en rodriguez iPhone y Android ! Introducing Kevin Madrid As a [facility] patient, I wanted to make you aware of our electronic visit tool called Kevin Kaliajaswant. [facility] 24/7 allows you to connect within minutes with a medical provider 24 hours a day, seven days a week via a mobile device or tablet or logging into a secure website from your computer. You can access Kevin Madrid from anywhere in the [place]. A virtual visit might be right for you when you have a simple condition and feel like you just dont want to get out of bed, or cant get away from work for an appointment, when your regular [facility] provider is not available (evenings, weekends or holidays), or when youre out of town and need minor care.   Electronic visits cost only $49 and if the Kevin Madrid provider determines a prescription is needed to treat your condition, one can be electronically transmitted to a nearby pharmacy*. Please take a moment to enroll today if you have not already done so. The enrollment process is free and takes just a few minutes. To enroll, please download the [employer] 24/7 prasanna to your tablet or phone, or visit [email or web address] to enroll on your computer. And, as an [address] [place] [address] patient with a In[employer] account, the results of your visits will be scanned into your electronic medical record and your primary care provider will be able to view the scanned results. We urge you to continue to see your regular [employer] provider for your ongoing medical care. And while your primary care provider may not be the one available when you seek a Gam[employer] virtual visit, the peace of mind you get from getting a real diagnosis real time can be priceless. For more information on [employer], view our Frequently Asked Questions (FAQs) at [email or web address]. Sincerely, 
 
Marivel Cervantes MD 
Chief Medical Officer [employer] *:  certain medications cannot be prescribed via [employer] Providers Seen During Your Hospitalization Provider Specialty Primary office phone Alda Husain MD Colon and Rectal Surgery [phone] Your Primary Care Physician (PCP) Primary Care Physician Office Phone Office Fax Praneeth Ramírez [phone] You are allergic to the following No active allergies Recent Documentation Height Weight BMI Smoking Status 1.626 m 65.1 kg 24.63 kg/m2 Never Smoker Emergency Contacts Name Discharge Info Relation Home Work Mobile [facility] DISCHARGE CAREGIVER [3] Sister [23] [phone] Patient Belongings The following personal items are in your possession at time of discharge: 
  Dental Appliances: None  Visual Aid: None Please provide this summary of care documentation to your next provider. Signatures-by signing, you are acknowledging that this After Visit Summary has been reviewed with you and you have received a copy. Patient Signature:  ____________________________________________________________ Date:  ____________________________________________________________  
  
Claudene Born Provider Signature:  ____________________________________________________________ Date:  ____________________________________________________________  
  
  
   
  
303 [facility] 
 
 
 [address] 
[phone] Patient: Duglas Nix MRN: [id number] :1965 Sobre levy hospitalización Le admitieron el:  May 10, 2018 Levy tratamiento más reciente fue el:  [address] PHASE 2 RECOVERY Le dieron de reyes el: May 10, 2018 Por qué le ingresaron Levy diagnosis primaria es:  No está archivado/a Follow-up Information Follow up With Details Comments Contact MD [facility] [phone] Tesha Powell MD  Please contact Dr Rony Adams for any questions [id number] [facility] [address] [address] [phone] Discharge Orders [email or web address] [facility] A check sheryl indicates which time of day the medication should be taken. My Medications SIGA tomando estos medicamentos Instructions Each Dose to Equal  
 Morning Noon Evening Bedtime ALLERGY 25 mg tablet Medicamento genérico:  diphenhydrAMINE Your last dose was: Your next dose is: Take 25 mg by mouth every six (6) hours as needed for Sleep. 25 mg  
    
   
   
   
  
 chlorproMAZINE 100 mg tablet También conocido maite:  THORAZINE Your last dose was: Your next dose is: Take 100 mg by mouth three (3) times daily. 100 mg COREG 12.5 mg tablet Medicamento genérico:  carvedilol Your last dose was: Your next dose is: Take 12.5 mg by mouth two (2) times daily (with meals). 12.5 mg  
    
   
   
   
  
 digoxin 0.125 mg tablet También conocido maite:  LANOXIN Your last dose was: Your next dose is: Take  by mouth daily. enalapril 2.5 mg tablet También conocido maite:  Noé Platt Your last dose was: Your next dose is: Take  by mouth daily. furosemide 20 mg tablet También conocido maite:  LASIX Your last dose was: Your next dose is: Take every other day  
     
   
   
   
  
 isosorbide mononitrate ER 30 mg tablet También conocido maite:  IMDUR Your last dose was: Your next dose is: Take  by mouth daily. levothyroxine 125 mcg tablet También conocido maite:  SYNTHROID Your last dose was: Your next dose is: Take  by mouth Daily (before breakfast). nitroglycerin 0.4 mg SL tablet También conocido maite:  NITROSTAT Your last dose was: Your next dose is:    
   
   
 1 Tab by SubLINGual route every five (5) minutes as needed for Chest Pain. 0.4 mg  
    
   
   
   
  
 ZANTAC 300 mg tablet Medicamento genérico:  raNITIdine Your last dose was: Your next dose is: Take 300 mg by mouth daily. 300 mg  
    
   
   
   
  
 ZOCOR 40 mg tablet Medicamento genérico:  simvastatin Your last dose was: Your next dose is: Take  by mouth nightly. DEHARSHAD esposito abdirahman [employer] PEG 3350-Electrolytes 236-22.74-6.74 -5.86 gram suspension También conocido maite:  GO-LYTELY Instrucciones a krista de reyes Colonoscopy Discharge Instructions Tyesha Bailey 
[id number] 
1965 COLONOSCOPY FINDINGS: 
Your colonoscopy showed: 1. Mild diverticulosis of the sigmoid colon. 2. Otherwise normal examination. FOLLOW UP RECOMMENDATIONS: 
 Dr. Daya Almaguer recommends your next colonoscopy in 10 years. DISCOMFORT: 
If you have redness at your IV site- apply warm compress to area; if redness or soreness persist- contact your physician There may be a slight amount of blood passed from the rectum, more than a teaspoon of bright red blood is not expected - contact your physician Gaseous discomfort is common- walking, belching will help relieve any gas pains. If discomfort persist- contact your physician DIET: 
 High fiber diet. ACTIVITY: 
You may resume your normal daily activities, however, it is recommended that you spend the remainder of the day resting - avoiding any strenuous activities. You may not operate a vehicle for 24 hours You may not engage in an occupation involving machinery or appliances for rest of today You may not drink alcoholic beverages for at least 24 hours Avoid making any critical decisions for at least 24 hour CALL M.D. ANY SIGN OF: Increasing pain, nausea, vomiting Abdominal distension (swelling) New increased bleeding Fever or chills Pain in chest area or shortness of breath Yue Desir MD, FACS, FASCRS Colon and Rectal Surgery [facility] Office [phone] Fax     [phone] Patient armband removed and given to patient to take home. Patient was informed of the privacy risks if armband lost or stolen Introducing [address]! [facility] introduce portal paciente MyChart . Ahora se puede acceder a partes de rodriguez expediente médico, enviar por correo electrónico la oficina de rodriguez médico y solicitar renovaciones de medicamentos en línea. En rodriguez navegador de Internet , Rory Landeros a [email or web address] Sherlyn clic en el usuario por Jean Marie Rothman? Parisa Servando clic aquí en la sesión Samina Hernandez. Verá la página de registro [place]. Ingrese rodriguez código de [employer] silas y maite aparece a continuación. Usted no tendrá que [employer] código después de becki completado el proceso de registro . Si usted no se inscribe antes de la fecha de caducidad , debe solicitar un nuevo código. · MyChart Código de acceso : [id number] Expires: 7/12/2018  1:04 PM 
 
Ingresa los últimos cuatro dígitos de rodriguez Número de Seguro Social ( xxxx ) y fecha de nacimiento ( dd / mm / aaaa ) maite se indica y sherlyn clic en Enviar. Usted será llevado a la siguiente página de registro . Crear un ID MyChart . Esta será rodriguez ID de inicio de sesión de MyChart y no puede ser [place] , por lo que pensar en abraham que es Dayna Curia y fácil de recordar . Crear abraham contraseña MyChart . Usted puede cambiar rodriguez contraseña en cualquier momento . Ingrese rodriguez Password Reset de preguntas y [place] . [place] se puede utilizar en un momento posterior si usted olvida rodriguez contraseña. Introduzca rodriguez dirección de correo electrónico . Lalitha Reapenrico recibirá abraham notificación por correo electrónico cuando la nueva información está disponible en MyChart . Dilcia Filter clic en Registrarse. Vladislav Hay anup y descargar porciones de rodriguez expediente médico. 
Sherlyn clic en el enlace de descarga del menú Resumen para descargar abraham copia portátil de rodriguez información médica . Si tiene [employer] , por favor visite la sección de preguntas frecuentes del sitio web MyChart . Recuerde, MyChart NO es que se utilizará para las necesidades urgentes. Para emergencias médicas , llame al 911 . Ahora disponible en rodriguez iPhone y Android ! Introducing Kevin Madrid As a [facility] patient, I wanted to make you aware of our electronic visit tool called Kevin Madrid. [facility] 24/7 allows you to connect within minutes with a medical provider 24 hours a day, seven days a week via a mobile device or tablet or logging into a secure website from your computer. You can access BombBomb from anywhere in the [place]. A virtual visit might be right for you when you have a simple condition and feel like you just dont want to get out of bed, or cant get away from work for an appointment, when your regular [employer] provider is not available (evenings, weekends or holidays), or when youre out of town and need minor care. Electronic visits cost only $49 and if the [employer] 24/7 provider determines a prescription is needed to treat your condition, one can be electronically transmitted to a nearby pharmacy*. Please take a moment to enroll today if you have not already done so. The enrollment process is free and takes just a few minutes. To enroll, please download the [employer] 24/7 prasanna to your tablet or phone, or visit [email or web address] to enroll on your computer. And, as an [address] [place] [address] patient with a [employer] account, the results of your visits will be scanned into your electronic medical record and your primary care provider will be able to view the scanned results. We urge you to continue to see your regular [employer] provider for your ongoing medical care. And while your primary care provider may not be the one available when you seek a Kevin Mottamelanifin virtual visit, the peace of mind you get from getting a real diagnosis real time can be priceless. For more information on BombBomb, view our Frequently Asked Questions (FAQs) at [email or web address]. Sincerely, 
 
Tasia Brown MD 
Chief Medical Officer [address] *:  certain medications cannot be prescribed via AutomateItmelanifin Providers Seen During Your Hospitalization Personal Médico Especialidad Teléfono principal de la ofmca Tejal Gabriel MD Colon and Rectal Surgery [phone] Levy médico de atención primaria (PCP ) Primary Care Physician Office Phone Office Fax Amanuel Otero [phone] Tiene alergias a lo siguiente No tiene alergias Documentación recientes Height Weight BMI (IMC) Glenna gonzalez 1.626 m 65.1 kg 24.63 kg/m2 Never Smoker Emergency Contacts Name Discharge Info Relation Home Work Mobile [facility] DISCHARGE CAREGIVER [3] Sister [23] [phone] Patient Belongings The following personal items are in your possession at time of discharge: 
  Dental Appliances: None  Visual Aid: None Please provide this summary of care documentation to your next provider. Signatures-by signing, you are acknowledging that this After Visit Summary has been reviewed with you and you have received a copy. Patient Signature:  ____________________________________________________________ Date:  ____________________________________________________________  
  
Arna Boise Provider Signature:  ____________________________________________________________ Date:  ____________________________________________________________

## 2020-05-08 ENCOUNTER — HOSPITAL ENCOUNTER (OUTPATIENT)
Dept: GENERAL RADIOLOGY | Age: 55
Discharge: HOME | End: 2020-05-08
Payer: MEDICARE

## 2020-05-08 DIAGNOSIS — M25.562 LEFT KNEE PAIN: ICD-10-CM

## 2020-05-08 PROCEDURE — 73562 X-RAY EXAM OF KNEE 3: CPT

## 2020-10-09 ENCOUNTER — TRANSCRIBE ORDER (OUTPATIENT)
Dept: SCHEDULING | Age: 55
End: 2020-10-09

## 2020-10-09 DIAGNOSIS — R79.89 ELEVATED SERUM CREATININE: Primary | ICD-10-CM

## 2020-10-09 DIAGNOSIS — N28.1 RENAL CYST, ACQUIRED: ICD-10-CM

## 2020-10-13 ENCOUNTER — TRANSCRIBE ORDER (OUTPATIENT)
Dept: REGISTRATION | Age: 55
End: 2020-10-13

## 2020-10-13 ENCOUNTER — HOSPITAL ENCOUNTER (OUTPATIENT)
Dept: LAB | Age: 55
Discharge: HOME | End: 2020-10-13
Payer: MEDICARE

## 2020-10-13 DIAGNOSIS — R79.89 HYPOURICEMIA: ICD-10-CM

## 2020-10-13 DIAGNOSIS — R79.89 HYPOURICEMIA: Primary | ICD-10-CM

## 2020-10-13 LAB
ANION GAP SERPL CALC-SCNC: ABNORMAL MMOL/L (ref 3–18)
BUN SERPL-MCNC: 28 MG/DL (ref 7–18)
BUN/CREAT SERPL: 15 (ref 12–20)
CALCIUM SERPL-MCNC: 8.2 MG/DL (ref 8.5–10.1)
CHLORIDE SERPL-SCNC: 106 MMOL/L (ref 100–111)
CO2 SERPL-SCNC: 30 MMOL/L (ref 21–32)
CREAT SERPL-MCNC: 1.83 MG/DL (ref 0.6–1.3)
GLUCOSE SERPL-MCNC: 133 MG/DL (ref 74–99)
POTASSIUM SERPL-SCNC: 4.9 MMOL/L (ref 3.5–5.5)
SODIUM SERPL-SCNC: 135 MMOL/L (ref 136–145)

## 2020-10-13 PROCEDURE — 80048 BASIC METABOLIC PNL TOTAL CA: CPT

## 2020-10-13 PROCEDURE — 36415 COLL VENOUS BLD VENIPUNCTURE: CPT

## 2025-04-14 ENCOUNTER — OFFICE VISIT (OUTPATIENT)
Age: 60
End: 2025-04-14
Payer: MEDICARE

## 2025-04-14 VITALS
OXYGEN SATURATION: 95 % | HEIGHT: 64 IN | DIASTOLIC BLOOD PRESSURE: 60 MMHG | WEIGHT: 133 LBS | BODY MASS INDEX: 22.71 KG/M2 | SYSTOLIC BLOOD PRESSURE: 88 MMHG | HEART RATE: 92 BPM

## 2025-04-14 DIAGNOSIS — I95.9 HYPOTENSION, UNSPECIFIED HYPOTENSION TYPE: ICD-10-CM

## 2025-04-14 DIAGNOSIS — Z01.818 PREOPERATIVE CLEARANCE: Primary | ICD-10-CM

## 2025-04-14 DIAGNOSIS — I05.9 MITRAL VALVE DISEASE: ICD-10-CM

## 2025-04-14 DIAGNOSIS — I20.89 ATYPICAL ANGINA: ICD-10-CM

## 2025-04-14 DIAGNOSIS — I25.10 CORONARY ARTERY DISEASE INVOLVING NATIVE CORONARY ARTERY OF NATIVE HEART, UNSPECIFIED WHETHER ANGINA PRESENT: ICD-10-CM

## 2025-04-14 DIAGNOSIS — I49.9 CARDIAC ARRHYTHMIA, UNSPECIFIED CARDIAC ARRHYTHMIA TYPE: ICD-10-CM

## 2025-04-14 DIAGNOSIS — I50.22 CHRONIC SYSTOLIC CHF (CONGESTIVE HEART FAILURE) (HCC): ICD-10-CM

## 2025-04-14 DIAGNOSIS — R94.31 ABNORMAL EKG: ICD-10-CM

## 2025-04-14 PROCEDURE — 93000 ELECTROCARDIOGRAM COMPLETE: CPT

## 2025-04-14 PROCEDURE — 99214 OFFICE O/P EST MOD 30 MIN: CPT

## 2025-04-14 PROCEDURE — G8420 CALC BMI NORM PARAMETERS: HCPCS

## 2025-04-14 PROCEDURE — 3017F COLORECTAL CA SCREEN DOC REV: CPT

## 2025-04-14 PROCEDURE — G8427 DOCREV CUR MEDS BY ELIG CLIN: HCPCS

## 2025-04-14 PROCEDURE — 1036F TOBACCO NON-USER: CPT

## 2025-04-14 RX ORDER — DICYCLOMINE HCL 20 MG
20 TABLET ORAL 4 TIMES DAILY
COMMUNITY
Start: 2025-04-10

## 2025-04-14 RX ORDER — TRAZODONE HYDROCHLORIDE 50 MG/1
50 TABLET ORAL DAILY
COMMUNITY
Start: 2024-05-07

## 2025-04-14 RX ORDER — TRIAMCINOLONE ACETONIDE 1 MG/G
1 CREAM TOPICAL 2 TIMES DAILY
COMMUNITY

## 2025-04-14 ASSESSMENT — ENCOUNTER SYMPTOMS
RHINORRHEA: 0
VOMITING: 0
SORE THROAT: 0
DIARRHEA: 0
COUGH: 0
SHORTNESS OF BREATH: 0
WHEEZING: 0
ABDOMINAL PAIN: 0
NAUSEA: 0

## 2025-04-14 ASSESSMENT — PATIENT HEALTH QUESTIONNAIRE - PHQ9
2. FEELING DOWN, DEPRESSED OR HOPELESS: NOT AT ALL
SUM OF ALL RESPONSES TO PHQ QUESTIONS 1-9: 0
1. LITTLE INTEREST OR PLEASURE IN DOING THINGS: NOT AT ALL
SUM OF ALL RESPONSES TO PHQ QUESTIONS 1-9: 0

## 2025-04-14 NOTE — PATIENT INSTRUCTIONS
Recommendations:  -Low salt diet - less than 2g daily   -Fluid restriction - 1.5L to 2L daily   -Get ECHO   -Stop enalapril   -Check blood pressure twice daily at home and bring readings to your next appointment     Patient Education        DASH Diet: Care Instructions  Your Care Instructions     The DASH diet is an eating plan that can help lower your blood pressure. DASH stands for Dietary Approaches to Stop Hypertension. Hypertension is high blood pressure.  The DASH diet focuses on eating foods that are high in calcium, potassium, and magnesium. These nutrients can lower blood pressure. The foods that are highest in these nutrients are fruits, vegetables, low-fat dairy products, nuts, seeds, and legumes. But taking calcium, potassium, and magnesium supplements instead of eating foods that are high in those nutrients does not have the same effect. The DASH diet also includes whole grains, fish, and poultry.  The DASH diet is one of several lifestyle changes your doctor may recommend to lower your high blood pressure. Your doctor may also want you to decrease the amount of sodium in your diet. Lowering sodium while following the DASH diet can lower blood pressure even further than just the DASH diet alone.  Follow-up care is a key part of your treatment and safety. Be sure to make and go to all appointments, and call your doctor if you are having problems. It's also a good idea to know your test results and keep a list of the medicines you take.  How can you care for yourself at home?  Following the DASH diet  Eat 4 to 5 servings of fruit each day. A serving is 1 medium-sized piece of fruit, 1/2 cup raw or canned fruit, 1/4 cup dried fruit, or 4 ounces (1/2 cup) of fruit juice. Choose fruit more often than fruit juice.  Eat 4 to 5 servings of vegetables each day. A serving is 1 cup of lettuce or raw leafy vegetables, 1/2 cup of chopped or cooked vegetables, or 4 ounces (1/2 cup) of vegetable juice. Choose

## 2025-04-14 NOTE — PROGRESS NOTES
1. \"Have you been to the ER, urgent care clinic since your last visit?  Hospitalized since your last visit?\" Reviewed by YEHUDA Coffey    2. \"Have you seen or consulted any other health care providers outside of the [facility] since your last visit?\" Reviewed by  YEHUDA Coffey   
function, mild mitral regurgitation, mitral valve is thickened and calcified, normal PASP less than 3 mmHg    Subjective   SUBJECTIVE/OBJECTIVE:  HPI  See above  Past Medical History:   Diagnosis Date    Cardiomyopathy     Essential hypertension     Hyperlipidemia     Mental retardation     per sister    Mini stroke 2014    Thyroid disease         Past Surgical History:   Procedure Laterality Date    APPENDECTOMY      CARDIAC CATHETERIZATION      COLONOSCOPY N/A 5/10/2018    COLONOSCOPY performed by Rodolfo Phillips MD at [facility]COPY    CORONARY ANGIOPLASTY WITH STENT PLACEMENT          No Known Allergies     Current Outpatient Medications   Medication Sig Dispense Refill    dicyclomine (BENTYL) 20 MG tablet Take 1 tablet by mouth in the morning, at noon, in the evening, and at bedtime      traZODone (DESYREL) 50 MG tablet Take 1 tablet by mouth daily      tamsulosin (FLOMAX) 0.4 MG capsule TAKE 1 CAPSULE BY MOUTH ONCE DAILY EVERY NIGHT AT BEDTIME      omeprazole (PRILOSEC) 40 MG delayed release capsule Take 1 capsule by mouth daily      atorvastatin (LIPITOR) 40 MG tablet Take 1 tablet by mouth nightly      ketoconazole (NIZORAL) 2 % cream Apply 1 Application topically daily      lidocaine (LIDODERM) 5 % Apply 1 patch as directed for 12 hours every 24 hours (12 hours on, 12 hours off)      meclizine (ANTIVERT) 25 MG CHEW CHEW AND SWALLOW 1 TABLET THREE TIMES DAILY AS NEEDED      methocarbamol (ROBAXIN) 750 MG tablet Take 1 tablet by mouth every 4 hours as needed      hydrOXYzine HCl (ATARAX) 25 MG tablet Take 1 tablet by mouth nightly as needed      hydrocortisone 2.5 % ointment       vitamin D3 (CHOLECALCIFEROL) 125 MCG (5000 UT) TABS tablet Take 1 tablet by mouth daily      carvedilol (COREG) 12.5 MG tablet Take 1 tablet by mouth 2 times daily (with meals)      chlorproMAZINE (THORAZINE) 100 MG tablet Take 1 tablet by mouth 3 times daily (Patient taking differently: Take 2 tablets by mouth nightly)      digoxin

## 2025-04-21 ENCOUNTER — CLINICAL SUPPORT (OUTPATIENT)
Age: 60
End: 2025-04-21

## 2025-04-21 VITALS — HEART RATE: 85 BPM | DIASTOLIC BLOOD PRESSURE: 73 MMHG | SYSTOLIC BLOOD PRESSURE: 112 MMHG

## 2025-04-21 NOTE — PROGRESS NOTES
Kiko Doan was seen in our office today for BP evaluation. Listed below are the patients current meds:      Current Outpatient Medications:     omeprazole (PRILOSEC) 40 MG delayed release capsule, Take 1 capsule by mouth daily, Disp: , Rfl:     atorvastatin (LIPITOR) 40 MG tablet, Take 1 tablet by mouth nightly, Disp: , Rfl:     carvedilol (COREG) 12.5 MG tablet, Take 1 tablet by mouth 2 times daily (with meals), Disp: , Rfl:     digoxin (LANOXIN) 125 MCG tablet, Take by mouth daily, Disp: , Rfl:     furosemide (LASIX) 20 MG tablet, Take every other day, Disp: , Rfl:     isosorbide mononitrate (IMDUR) 30 MG extended release tablet, Take by mouth daily, Disp: , Rfl:     nitroGLYCERIN (NITROSTAT) 0.4 MG SL tablet, Place 1 tablet under the tongue, Disp: , Rfl:     simvastatin (ZOCOR) 40 MG tablet, Take by mouth, Disp: , Rfl:     tamsulosin (FLOMAX) 0.4 MG capsule, Take 1 capsule by mouth daily, Disp: 90 capsule, Rfl: 2    dicyclomine (BENTYL) 20 MG tablet, Take 1 tablet by mouth in the morning, at noon, in the evening, and at bedtime, Disp: , Rfl:     traZODone (DESYREL) 50 MG tablet, Take 1 tablet by mouth daily, Disp: , Rfl:     triamcinolone (KENALOG) 0.1 % cream, Apply 1 Application topically 2 times daily, Disp: , Rfl:     ketoconazole (NIZORAL) 2 % cream, Apply 1 Application topically daily, Disp: , Rfl:     lidocaine (LIDODERM) 5 %, Apply 1 patch as directed for 12 hours every 24 hours (12 hours on, 12 hours off), Disp: , Rfl:     meclizine (ANTIVERT) 25 MG CHEW, CHEW AND SWALLOW 1 TABLET THREE TIMES DAILY AS NEEDED, Disp: , Rfl:     methocarbamol (ROBAXIN) 750 MG tablet, Take 1 tablet by mouth every 4 hours as needed, Disp: , Rfl:     hydrOXYzine HCl (ATARAX) 25 MG tablet, Take 1 tablet by mouth nightly as needed, Disp: , Rfl:     hydrocortisone 2.5 % ointment, , Disp: , Rfl:     vitamin D3 (CHOLECALCIFEROL) 125 MCG (5000 UT) TABS tablet, Take 1 tablet by mouth daily, Disp: , Rfl:     chlorproMAZINE

## (undated) DEVICE — SYR 50ML SLIP TIP NSAF LF STRL --

## (undated) DEVICE — FLEX ADVANTAGE 1500CC: Brand: FLEX ADVANTAGE

## (undated) DEVICE — DEVICE INFL 60ML 12ATM CONVENIENT LOK REL HNDL HI PRSS FLX

## (undated) DEVICE — KENDALL 500 SERIES DIAPHORETIC FOAM MONITORING ELECTRODE - TEAR DROP SHAPE ( 30/PK): Brand: KENDALL

## (undated) DEVICE — MEDI-VAC NON-CONDUCTIVE SUCTION TUBING: Brand: CARDINAL HEALTH

## (undated) DEVICE — KIT COLON W/ 1.1OZ LUB AND 2 END

## (undated) DEVICE — BASIN EMESIS 500CC ROSE 250/CS 60/PLT: Brand: MEDEGEN MEDICAL PRODUCTS, LLC